# Patient Record
Sex: FEMALE | Race: WHITE
[De-identification: names, ages, dates, MRNs, and addresses within clinical notes are randomized per-mention and may not be internally consistent; named-entity substitution may affect disease eponyms.]

---

## 2017-07-05 ENCOUNTER — HOSPITAL ENCOUNTER (EMERGENCY)
Dept: HOSPITAL 8 - ED | Age: 37
Discharge: HOME | End: 2017-07-05
Payer: SELF-PAY

## 2017-07-05 VITALS — SYSTOLIC BLOOD PRESSURE: 112 MMHG | DIASTOLIC BLOOD PRESSURE: 87 MMHG

## 2017-07-05 VITALS — BODY MASS INDEX: 34.11 KG/M2 | HEIGHT: 68 IN | WEIGHT: 225.09 LBS

## 2017-07-05 DIAGNOSIS — H81.10: Primary | ICD-10-CM

## 2017-07-05 DIAGNOSIS — F17.200: ICD-10-CM

## 2017-07-05 PROCEDURE — 99283 EMERGENCY DEPT VISIT LOW MDM: CPT

## 2017-12-20 ENCOUNTER — HOSPITAL ENCOUNTER (EMERGENCY)
Dept: HOSPITAL 8 - ED | Age: 37
LOS: 1 days | End: 2017-12-21
Payer: SELF-PAY

## 2017-12-20 VITALS — DIASTOLIC BLOOD PRESSURE: 82 MMHG | SYSTOLIC BLOOD PRESSURE: 120 MMHG

## 2017-12-20 DIAGNOSIS — J20.9: Primary | ICD-10-CM

## 2017-12-20 DIAGNOSIS — J02.8: ICD-10-CM

## 2017-12-20 DIAGNOSIS — F17.210: ICD-10-CM

## 2017-12-20 PROCEDURE — 71020: CPT

## 2017-12-20 PROCEDURE — 99284 EMERGENCY DEPT VISIT MOD MDM: CPT

## 2018-01-24 ENCOUNTER — NON-PROVIDER VISIT (OUTPATIENT)
Dept: URGENT CARE | Facility: PHYSICIAN GROUP | Age: 38
End: 2018-01-24

## 2018-01-24 DIAGNOSIS — Z02.1 PRE-EMPLOYMENT DRUG SCREENING: ICD-10-CM

## 2018-01-24 LAB
AMP AMPHETAMINE: NEGATIVE
BAR BARBITURATES: NEGATIVE
BZO BENZODIAZEPINES: NEGATIVE
COC COCAINE: NEGATIVE
INT CON NEG: NEGATIVE
INT CON POS: POSITIVE
MDMA ECSTASY: NEGATIVE
MET METHAMPHETAMINES: NEGATIVE
MTD METHADONE: NEGATIVE
OPI OPIATES: NEGATIVE
OXY OXYCODONE: NEGATIVE
PCP PHENCYCLIDINE: NEGATIVE
POC URINE DRUG SCREEN OCDRS: NORMAL
THC: NEGATIVE

## 2018-01-24 PROCEDURE — 80305 DRUG TEST PRSMV DIR OPT OBS: CPT | Performed by: PHYSICIAN ASSISTANT

## 2018-10-28 ENCOUNTER — HOSPITAL ENCOUNTER (EMERGENCY)
Dept: HOSPITAL 8 - ED | Age: 38
Discharge: HOME | End: 2018-10-28
Payer: COMMERCIAL

## 2018-10-28 VITALS — SYSTOLIC BLOOD PRESSURE: 121 MMHG | DIASTOLIC BLOOD PRESSURE: 95 MMHG

## 2018-10-28 VITALS — WEIGHT: 215.61 LBS | BODY MASS INDEX: 32.68 KG/M2 | HEIGHT: 68 IN

## 2018-10-28 DIAGNOSIS — R55: ICD-10-CM

## 2018-10-28 DIAGNOSIS — R42: Primary | ICD-10-CM

## 2018-10-28 LAB
ALBUMIN SERPL-MCNC: 3.3 G/DL (ref 3.4–5)
ALP SERPL-CCNC: 96 U/L (ref 45–117)
ALT SERPL-CCNC: 26 U/L (ref 12–78)
ANION GAP SERPL CALC-SCNC: 8 MMOL/L (ref 5–15)
BASOPHILS # BLD AUTO: 0.03 X10^3/UL (ref 0–0.1)
BASOPHILS NFR BLD AUTO: 1 % (ref 0–1)
BILIRUB SERPL-MCNC: 0.4 MG/DL (ref 0.2–1)
CALCIUM SERPL-MCNC: 8.4 MG/DL (ref 8.5–10.1)
CHLORIDE SERPL-SCNC: 112 MMOL/L (ref 98–107)
CREAT SERPL-MCNC: 0.81 MG/DL (ref 0.55–1.02)
CULTURE INDICATED?: YES
EOSINOPHIL # BLD AUTO: 0.23 X10^3/UL (ref 0–0.4)
EOSINOPHIL NFR BLD AUTO: 4 % (ref 1–7)
ERYTHROCYTE [DISTWIDTH] IN BLOOD BY AUTOMATED COUNT: 13.7 % (ref 9.6–15.2)
HCG UR SG: 1.02 (ref 1–1.03)
LYMPHOCYTES # BLD AUTO: 1.35 X10^3/UL (ref 1–3.4)
LYMPHOCYTES NFR BLD AUTO: 22 % (ref 22–44)
MCH RBC QN AUTO: 32.3 PG (ref 27–34.8)
MCHC RBC AUTO-ENTMCNC: 33.6 G/DL (ref 32.4–35.8)
MCV RBC AUTO: 95.9 FL (ref 80–100)
MD: NO
MICROSCOPIC: (no result)
MONOCYTES # BLD AUTO: 0.42 X10^3/UL (ref 0.2–0.8)
MONOCYTES NFR BLD AUTO: 7 % (ref 2–9)
NEUTROPHILS # BLD AUTO: 4.04 X10^3/UL (ref 1.8–6.8)
NEUTROPHILS NFR BLD AUTO: 67 % (ref 42–75)
PLATELET # BLD AUTO: 242 X10^3/UL (ref 130–400)
PMV BLD AUTO: 7.8 FL (ref 7.4–10.4)
PROT SERPL-MCNC: 6.7 G/DL (ref 6.4–8.2)
RBC # BLD AUTO: 4.19 X10^6/UL (ref 3.82–5.3)

## 2018-10-28 PROCEDURE — 96374 THER/PROPH/DIAG INJ IV PUSH: CPT

## 2018-10-28 PROCEDURE — 36415 COLL VENOUS BLD VENIPUNCTURE: CPT

## 2018-10-28 PROCEDURE — 81025 URINE PREGNANCY TEST: CPT

## 2018-10-28 PROCEDURE — 87147 CULTURE TYPE IMMUNOLOGIC: CPT

## 2018-10-28 PROCEDURE — 80053 COMPREHEN METABOLIC PANEL: CPT

## 2018-10-28 PROCEDURE — 85025 COMPLETE CBC W/AUTO DIFF WBC: CPT

## 2018-10-28 PROCEDURE — 96361 HYDRATE IV INFUSION ADD-ON: CPT

## 2018-10-28 PROCEDURE — 81001 URINALYSIS AUTO W/SCOPE: CPT

## 2018-10-28 PROCEDURE — 93005 ELECTROCARDIOGRAM TRACING: CPT

## 2018-10-28 PROCEDURE — 99285 EMERGENCY DEPT VISIT HI MDM: CPT

## 2018-10-28 PROCEDURE — 87086 URINE CULTURE/COLONY COUNT: CPT

## 2019-02-05 ENCOUNTER — HOSPITAL ENCOUNTER (EMERGENCY)
Dept: HOSPITAL 8 - ED | Age: 39
Discharge: HOME | End: 2019-02-05
Payer: SELF-PAY

## 2019-02-05 VITALS — DIASTOLIC BLOOD PRESSURE: 78 MMHG | SYSTOLIC BLOOD PRESSURE: 117 MMHG

## 2019-02-05 VITALS — HEIGHT: 60 IN | BODY MASS INDEX: 42.42 KG/M2 | WEIGHT: 216.05 LBS

## 2019-02-05 DIAGNOSIS — F17.210: ICD-10-CM

## 2019-02-05 DIAGNOSIS — L02.415: Primary | ICD-10-CM

## 2019-02-05 PROCEDURE — 90715 TDAP VACCINE 7 YRS/> IM: CPT

## 2019-02-05 PROCEDURE — 90471 IMMUNIZATION ADMIN: CPT

## 2020-02-08 ENCOUNTER — HOSPITAL ENCOUNTER (EMERGENCY)
Dept: HOSPITAL 8 - ED | Age: 40
LOS: 1 days | Discharge: HOME | End: 2020-02-09
Payer: SELF-PAY

## 2020-02-08 VITALS — WEIGHT: 209.44 LBS | BODY MASS INDEX: 31.74 KG/M2 | HEIGHT: 68 IN

## 2020-02-08 VITALS — SYSTOLIC BLOOD PRESSURE: 110 MMHG | DIASTOLIC BLOOD PRESSURE: 68 MMHG

## 2020-02-08 DIAGNOSIS — A09: Primary | ICD-10-CM

## 2020-02-08 DIAGNOSIS — F17.200: ICD-10-CM

## 2020-02-08 DIAGNOSIS — R11.2: ICD-10-CM

## 2020-02-08 LAB
BASOPHILS # BLD AUTO: 0.03 X10^3/UL (ref 0–0.1)
BASOPHILS NFR BLD AUTO: 0 % (ref 0–1)
EOSINOPHIL # BLD AUTO: 0.17 X10^3/UL (ref 0–0.4)
EOSINOPHIL NFR BLD AUTO: 2 % (ref 1–7)
ERYTHROCYTE [DISTWIDTH] IN BLOOD BY AUTOMATED COUNT: 13.1 % (ref 9.6–15.2)
LYMPHOCYTES # BLD AUTO: 0.79 X10^3/UL (ref 1–3.4)
LYMPHOCYTES NFR BLD AUTO: 8 % (ref 22–44)
MCH RBC QN AUTO: 32.4 PG (ref 27–34.8)
MCHC RBC AUTO-ENTMCNC: 33 G/DL (ref 32.4–35.8)
MCV RBC AUTO: 98.3 FL (ref 80–100)
MD: NO
MONOCYTES # BLD AUTO: 0.26 X10^3/UL (ref 0.2–0.8)
MONOCYTES NFR BLD AUTO: 3 % (ref 2–9)
NEUTROPHILS # BLD AUTO: 8.63 X10^3/UL (ref 1.8–6.8)
NEUTROPHILS NFR BLD AUTO: 87 % (ref 42–75)
PLATELET # BLD AUTO: 279 X10^3/UL (ref 130–400)
PMV BLD AUTO: 7.4 FL (ref 7.4–10.4)
RBC # BLD AUTO: 4.81 X10^6/UL (ref 3.82–5.3)

## 2020-02-08 PROCEDURE — 36415 COLL VENOUS BLD VENIPUNCTURE: CPT

## 2020-02-08 PROCEDURE — 80053 COMPREHEN METABOLIC PANEL: CPT

## 2020-02-08 PROCEDURE — 99283 EMERGENCY DEPT VISIT LOW MDM: CPT

## 2020-02-08 PROCEDURE — 85025 COMPLETE CBC W/AUTO DIFF WBC: CPT

## 2020-02-09 LAB
ALBUMIN SERPL-MCNC: 3.5 G/DL (ref 3.4–5)
ALP SERPL-CCNC: 117 U/L (ref 45–117)
ALT SERPL-CCNC: 51 U/L (ref 12–78)
ANION GAP SERPL CALC-SCNC: 4 MMOL/L (ref 5–15)
BILIRUB SERPL-MCNC: 0.9 MG/DL (ref 0.2–1)
CALCIUM SERPL-MCNC: 8.3 MG/DL (ref 8.5–10.1)
CHLORIDE SERPL-SCNC: 107 MMOL/L (ref 98–107)
CREAT SERPL-MCNC: 0.94 MG/DL (ref 0.55–1.02)
PROT SERPL-MCNC: 7.2 G/DL (ref 6.4–8.2)

## 2020-02-09 NOTE — NUR
Pt reports n/v/d that began yesterday at 0500, states her kids got her sick. 
Placed vitals signs monitoring placed. ERP at bedside.

## 2022-11-13 ENCOUNTER — HOSPITAL ENCOUNTER (EMERGENCY)
Facility: MEDICAL CENTER | Age: 42
End: 2022-11-13
Attending: EMERGENCY MEDICINE
Payer: COMMERCIAL

## 2022-11-13 VITALS
OXYGEN SATURATION: 100 % | BODY MASS INDEX: 37.26 KG/M2 | RESPIRATION RATE: 18 BRPM | TEMPERATURE: 98 F | SYSTOLIC BLOOD PRESSURE: 122 MMHG | HEART RATE: 92 BPM | HEIGHT: 69 IN | WEIGHT: 251.54 LBS | DIASTOLIC BLOOD PRESSURE: 78 MMHG

## 2022-11-13 DIAGNOSIS — Z01.419 NORMAL VAGINAL EXAM: ICD-10-CM

## 2022-11-13 PROCEDURE — 99284 EMERGENCY DEPT VISIT MOD MDM: CPT

## 2022-11-13 ASSESSMENT — LIFESTYLE VARIABLES
DO YOU DRINK ALCOHOL: NO
HAVE PEOPLE ANNOYED YOU BY CRITICIZING YOUR DRINKING: NO
TOTAL SCORE: 0
HAVE YOU EVER FELT YOU SHOULD CUT DOWN ON YOUR DRINKING: NO
EVER HAD A DRINK FIRST THING IN THE MORNING TO STEADY YOUR NERVES TO GET RID OF A HANGOVER: NO
TOTAL SCORE: 0
CONSUMPTION TOTAL: INCOMPLETE
TOTAL SCORE: 0
EVER FELT BAD OR GUILTY ABOUT YOUR DRINKING: NO

## 2022-11-13 NOTE — ED NOTES
Pt A&O x 4 and ambulatory with a steady gait. Pt brought back from triage to [facility] [address]. Pt stated she has a tampon in for multiple days, with no other noted complaint. Pt placed into a position of comfort and given a call light with instructions on how to use it.

## 2022-11-13 NOTE — ED PROVIDER NOTES
"ED Provider Note    Scribed for Liz Goel M.D. by Bruce Barber. 11/13/2022  8:28 AM    Primary care provider: Pcp Pt States None  Means of arrival: Walk-In  History obtained from: Patient  History limited by: None    CHIEF COMPLAINT  Chief Complaint   Patient presents with    Foreign Body in Vagina     Pt reports that she has a tampon in that has been in for possibly 3 days.      HPI  Valery Wiseman is a 42 y.o. female who presents for retained tampon. She reports that she has had a tampon inside her vagina for 3 days, stating that she went to the bathroom and forgot to take it out and realized that it was still inside yesterday. She reports attempting to remove it and states that the pelvic pain is similar to period cramps which she normally gets during her menstrual cycle.  Valery has taken Ibuprofen yesterday with mild alleviation, normally greater alleviation is achieved. She endorses associated chills last night but denies any fever or increased urinary frequency. She is currently on her period but has not seen menstrual blood in 3 days. She denies inserting anything else into her vagina. Valery denies any history of diabetes or other pertinent medical history.  Patient reports that she does not think she is pregnant as she has not had sex in \"years.\"    REVIEW OF SYSTEMS  Pertinent positives include abdominal pain, pain when urinating, and chills.   Pertinent negatives include no fever, increased urinary frequency.   See HPI for further details. All other systems are negative.    PAST MEDICAL HISTORY  History reviewed. No pertinent past medical history.    FAMILY HISTORY  History reviewed. No pertinent family history.    SOCIAL HISTORY  Social History     Tobacco Use    Smoking status: Every Day     Packs/day: 0.33     Types: Cigarettes    Smokeless tobacco: Never   Vaping Use    Vaping Use: Never used   Substance Use Topics    Alcohol use: Not Currently    Drug use: Not Currently      Social History " "    Substance and Sexual Activity   Drug Use Not Currently       SURGICAL HISTORY  Past Surgical History:   Procedure Laterality Date    GYN SURGERY             CURRENT MEDICATIONS  Home Medications       Reviewed by Sammi Quick R.N. (Registered Nurse) on 22 at 0728  Med List Status: Not Addressed     Medication Last Dose Status        Patient Rahul Taking any Medications                           ALLERGIES  No Known Allergies    PHYSICAL EXAM  VITAL SIGNS: /83   Pulse 99   Temp 36 °C (96.8 °F) (Temporal)   Resp 18   Ht 1.753 m (5' 9\")   Wt 114 kg (251 lb 8.7 oz)   LMP 11/10/2022   SpO2 100%   BMI 37.15 kg/m²    Constitutional: Well developed, well nourished; No acute distress; Non-toxic appearance. Elevated BMI.   HENT: Normocephalic, atraumatic; Bilateral external ears normal; Oropharynx with moist mucous membranes; No erythema or exudates in the posterior oropharynx.   Eyes: PERRL, EOMI, Conjunctiva normal. No discharge.   Neck:  Supple, nontender midline; No stridor; No nuchal rigidity.   Lymphatic: No cervical lymphadenopathy noted.   Cardiovascular: Regular rate and rhythm without murmurs, rubs, or gallop.   Thorax & Lungs: No respiratory distress, breath sounds clear to auscultation bilaterally without wheezing, rales or rhonchi. Nontender chest wall. No crepitus or subcutaneous air  Abdomen: Soft, mild inconsistent tenderness in the suprapubic area, bowel sounds normal. No obvious masses; No pulsatile masses; no rebound, guarding, or peritoneal signs.   Skin: Good color; warm and dry without rash or petechia.  Back: Nontender, No CVA tenderness.   Extremities: Distal radial, dorsalis pedis, posterior tibial pulses are equal bilaterally; Trace edema to ankles; Nontender calves or saphenous, No cyanosis, No clubbing.  : External genitalia is normal without lesions or vesicles.  Easily able to visualize cervix, anterior and posterior fornices. small amount of dark menstrual " "blood in vault. Digital exam preformed and able to feel all around cervix and into the fornices with no palpable tampon.  At this time there does not appear to be retained tampon upon visual inspection or palpation/tactile inspection.  Musculoskeletal: Good range of motion in all major joints. No tenderness to palpation or major deformities noted.   Neurologic: Alert & oriented x 4, clear speech.    COURSE & MEDICAL DECISION MAKING  Pertinent Labs & Imaging studies reviewed. (See chart for details)    8:28 AM - Patient seen and examined at bedside. Discussed plan of care, including pelvic exam to attempt removal of tampon (see PE for further detail). Patient agrees to the plan of care. Ordered for pregnancy test to evaluate her symptoms.     9:24 AM Patient refuses pregnancy test reporting \"I haven't had sex in years.\" Discharge was discussed at this time. Patient verbalizes understanding and agreement to this plan of care.      Patient presents to the ER complaining of a retained tampon.  She believes she put a tampon in 3 days ago.  She says she recalls going to the bathroom and then remembered yesterday that she had forgotten to take it out.  She said that she and another friend tried to find it but  were unable.  Patient said she has some chills last night.  She describes some lower abdominal cramping which feels very similar to menstrual cramping.  She is on her period.  She had a small amount of dark blood in the vaginal vault.  There is no visualized or palpated retained tampon.  I ordered pregnancy test but patient refused.  At this time patient is safe and stable for outpatient management discharge home.  No retained tampon identified.  Perhaps she took it out without realizing it, or perhaps she strained to have a bowel movement and it came out.  Nonetheless, the vaginal vault is clear at this time.  She has been given strict return precautions and discharge instructions and she understands treatment plan " and follow-up.    The patient will return for new or worsening symptoms and is stable at the time of discharge.    The patient is referred to a primary physician for blood pressure management, diabetic screening, and for all other preventative health concerns.    DISPOSITION:  Patient will be discharged home in stable condition.    FOLLOW UP:  [facility] ([facility]) - Primary Care and Family Medicine  [address] [place] [id number]  [phone]  Schedule an appointment as soon as possible for a visit in 2 days  If symptoms worsen return to ER    FINAL IMPRESSION  1. Normal vaginal exam Acute      Bruce CARLSON (Scribe), am scribing for, and in the presence of, Liz Goel M.D..    Electronically signed by: Bruce Barber (Lorena), 11/13/2022    Liz CARLSON M.D. personally performed the services described in this documentation, as scribed by Bruce Barber in my presence, and it is both accurate and complete.    This dictation has been created using voice recognition software. The accuracy of the dictation is limited by the abilities of the software. I expect there may be some errors of grammar and possibly content. I made every attempt to manually correct the errors within my dictation. However, errors related to voice recognition software may still exist and should be interpreted within the appropriate context.    The note accurately reflects work and decisions made by me.  Liz Goel M.D.  11/13/2022  3:04 PM

## 2022-11-13 NOTE — DISCHARGE INSTRUCTIONS
Return to the ER for any worsening vaginal bleeding, pelvic pain, dizziness or lightheadedness, fevers over 100.4, shaking chills, abnormal vaginal discharge, nausea, vomiting, or for any concerns.    Follow-up with the [facility] clinic within the next 1 to 2 days.  Please call for appointment.

## 2022-11-13 NOTE — ED TRIAGE NOTES
Pt ambulated to triage with   Chief Complaint   Patient presents with    Foreign Body in Vagina     Pt reports that she has a tampon in that has been in for possibly 3 days.       Pt Informed regarding triage process and verbalized understanding to inform triage tech or RN for any changes in condition. Placed in lobby.

## 2022-11-13 NOTE — ED NOTES
Pt ambulatory.  Vital signs stable.   Pt states understanding of discharge instructions and paperwork.   Pt states they will follow up with PCP.   Pt states they have a safe way home.

## 2023-02-19 ENCOUNTER — HOSPITAL ENCOUNTER (EMERGENCY)
Facility: MEDICAL CENTER | Age: 43
End: 2023-02-19
Attending: EMERGENCY MEDICINE
Payer: COMMERCIAL

## 2023-02-19 VITALS
RESPIRATION RATE: 18 BRPM | WEIGHT: 239.86 LBS | SYSTOLIC BLOOD PRESSURE: 144 MMHG | OXYGEN SATURATION: 98 % | HEART RATE: 88 BPM | BODY MASS INDEX: 35.42 KG/M2 | DIASTOLIC BLOOD PRESSURE: 98 MMHG | TEMPERATURE: 99.4 F

## 2023-02-19 DIAGNOSIS — K04.7 DENTAL ABSCESS: ICD-10-CM

## 2023-02-19 PROCEDURE — 700102 HCHG RX REV CODE 250 W/ 637 OVERRIDE(OP): Performed by: EMERGENCY MEDICINE

## 2023-02-19 PROCEDURE — 700111 HCHG RX REV CODE 636 W/ 250 OVERRIDE (IP): Performed by: EMERGENCY MEDICINE

## 2023-02-19 PROCEDURE — 99283 EMERGENCY DEPT VISIT LOW MDM: CPT

## 2023-02-19 PROCEDURE — 700101 HCHG RX REV CODE 250: Performed by: EMERGENCY MEDICINE

## 2023-02-19 PROCEDURE — 303977 HCHG I & D

## 2023-02-19 PROCEDURE — A9270 NON-COVERED ITEM OR SERVICE: HCPCS | Performed by: EMERGENCY MEDICINE

## 2023-02-19 RX ORDER — HYDROCODONE BITARTRATE AND ACETAMINOPHEN 5; 325 MG/1; MG/1
1 TABLET ORAL ONCE
Status: COMPLETED | OUTPATIENT
Start: 2023-02-19 | End: 2023-02-19

## 2023-02-19 RX ORDER — ONDANSETRON 4 MG/1
4 TABLET, ORALLY DISINTEGRATING ORAL ONCE
Status: COMPLETED | OUTPATIENT
Start: 2023-02-19 | End: 2023-02-19

## 2023-02-19 RX ORDER — NAPROXEN 375 MG/1
375 TABLET ORAL 2 TIMES DAILY WITH MEALS
Qty: 20 TABLET | Refills: 0 | Status: SHIPPED | OUTPATIENT
Start: 2023-02-19

## 2023-02-19 RX ORDER — NAPROXEN 375 MG/1
375 TABLET ORAL 2 TIMES DAILY WITH MEALS
Qty: 20 TABLET | Refills: 0 | Status: SHIPPED | OUTPATIENT
Start: 2023-02-19 | End: 2023-02-19 | Stop reason: SDUPTHER

## 2023-02-19 RX ORDER — AMOXICILLIN AND CLAVULANATE POTASSIUM 875; 125 MG/1; MG/1
1 TABLET, FILM COATED ORAL 2 TIMES DAILY
Qty: 20 TABLET | Refills: 0 | Status: ACTIVE | OUTPATIENT
Start: 2023-02-19 | End: 2023-02-19 | Stop reason: SDUPTHER

## 2023-02-19 RX ORDER — AMOXICILLIN AND CLAVULANATE POTASSIUM 875; 125 MG/1; MG/1
1 TABLET, FILM COATED ORAL 2 TIMES DAILY
Qty: 20 TABLET | Refills: 0 | Status: SHIPPED | OUTPATIENT
Start: 2023-02-19 | End: 2023-02-19 | Stop reason: SDUPTHER

## 2023-02-19 RX ORDER — AMOXICILLIN AND CLAVULANATE POTASSIUM 875; 125 MG/1; MG/1
1 TABLET, FILM COATED ORAL 2 TIMES DAILY
Qty: 20 TABLET | Refills: 0 | Status: ACTIVE | OUTPATIENT
Start: 2023-02-19

## 2023-02-19 RX ORDER — AMOXICILLIN AND CLAVULANATE POTASSIUM 875; 125 MG/1; MG/1
1 TABLET, FILM COATED ORAL ONCE
Status: COMPLETED | OUTPATIENT
Start: 2023-02-19 | End: 2023-02-19

## 2023-02-19 RX ADMIN — ONDANSETRON 4 MG: 4 TABLET, ORALLY DISINTEGRATING ORAL at 17:14

## 2023-02-19 RX ADMIN — AMOXICILLIN AND CLAVULANATE POTASSIUM 1 TABLET: 875; 125 TABLET, FILM COATED ORAL at 17:14

## 2023-02-19 RX ADMIN — HYDROCODONE BITARTRATE AND ACETAMINOPHEN 1 TABLET: 5; 325 TABLET ORAL at 17:15

## 2023-02-19 RX ADMIN — LIDOCAINE HYDROCHLORIDE 5 ML: 10; .005 INJECTION, SOLUTION EPIDURAL; INFILTRATION; INTRACAUDAL; PERINEURAL at 17:15

## 2023-02-19 ASSESSMENT — PAIN DESCRIPTION - PAIN TYPE: TYPE: ACUTE PAIN

## 2023-02-19 NOTE — ED TRIAGE NOTES
Pt comes in complaining of R sided dental pain and swelling. Pt stating she does have a dental appt tomorrow. Pt tearful. Pt also reporting R sided ear pain

## 2023-02-20 NOTE — ED NOTES
Assist RN: Pt called regarding prescriptions. This ED RN did speak to Dr Michel and he will send updated prescriptions.

## 2023-02-20 NOTE — ED NOTES
Patient delay discharge due to waiting for ride.    Discharge instructions reviewed with pt. Pt verbalized understanding. Pt ambulated out of er with steady gait and all belongings. With rx and follow up instructions.

## 2023-02-20 NOTE — DISCHARGE PLANNING
John, from [facility], called and stated he spoke with the pt and she wants the [facility] at [address] [facility] to be her pharmacy on record. I told him I could make this change in Epic, and he stated he will let the pt know that her chart has been updated. I then made this change to her chart.

## 2023-02-20 NOTE — DISCHARGE PLANNING
Pt called and stated she has been waiting for 2 hours for her RX's at the [facility] on [facility] and they do not have them. ALBINO voalted Dr Michel who graciously resent RX's. ALBINO called [facility] and verified they received them. ALBINO then called pt and told her they were ready for .

## 2023-02-20 NOTE — ED PROVIDER NOTES
ER Provider Note    Scribed for Asad Michel D.O. by Tobi Horowitz. 2023  4:35 PM    Primary Care Provider: Pcp Pt States None    CHIEF COMPLAINT  Chief Complaint   Patient presents with    Dental Pain           Facial Swelling       e    Ear Pain       HPI/ROS    Valery Wiseman is a 42 y.o. female who presents to the Emergency Department for evaluation of acute right-sided dental pain onset four days ago.  Patient states that she has a dentist appointment tomorrow, however, is unable to stand the pain, prompting her to present to the ED today for further evaluation. Patient has associated swelling to her right jaw, ear pain, fevers, and chills. Denies any dysphagia. No alleviating factors reported. No known drug allergies.     ROS as per HPI.    PAST MEDICAL HISTORY  History reviewed. No pertinent past medical history.    SURGICAL HISTORY  Past Surgical History:   Procedure Laterality Date    GYN SURGERY             FAMILY HISTORY  No family history noted.     SOCIAL HISTORY   reports that she has been smoking cigarettes. She has been smoking an average of .33 packs per day. She has never used smokeless tobacco. She reports that she does not currently use alcohol. She reports that she does not currently use drugs.    CURRENT MEDICATIONS  None noted    ALLERGIES  Patient has no known allergies.    PHYSICAL EXAM  /82   Pulse (!) 121   Temp (!) 38.2 °C (100.8 °F) (Oral)   Resp 20   Wt 109 kg (239 lb 13.8 oz)   SpO2 98%   BMI 35.42 kg/m²     General: No acute distress.  HENT: Normocephalic, Mucus membranes are moist. Right maxillary facial swelling. Roof of mouth at the right posterior has a palpable abscess.   Chest: Lungs have even and unlabored respirations, Clear to auscultation.   Cardiovascular: Regular rate and regular rhythm, No peripheral cyanosis.  Abdomen: Non distended.  Neuro: Awake, Conversive, Able to relay recent events.  Psychiatric: Calm and cooperative.     EXTERNAL  RECORDS REVIEWED  No recent ER visits for dental pain    INITIAL ASSESSMENT  Patient has facial swelling with poor dentition and there is a palpable abscess. No airway compromise. No difficulty swallowing. Patient has an abscess drained here. Plans for antibiotics.     ED Observation Status? Yes; I am placing the patient in to an observation status due to a diagnostic uncertainty as well as therapeutic intensity. Patient placed in observation status at 4:53 PM, 2/19/2023.     Observation plan is as follows: I&D of abscess and initiate antibiotics and pain medication.     Upon Reevaluation, the patient's condition has: Improved; and will be discharged.    Patient discharged from ED Observation status at 5:06 PM (Time) 2/19/23 (Date).     PROCEDURES    Incision and Drainage Procedure Note    Indication: Abscess    Procedure: The patient was positioned appropriately and the skin over the incision site was prepped with betadine and draped in a sterile fashion. Local anesthesia was obtained by infiltration using 1% Lidocaine with epinephrine.  An incision was then made over the apex of the lesion and approximately 2 cc of thick material was expressed. Loculations were not present. The patient’s tetanus status was up to date and did not require a booster dose.    The patient tolerated the procedure well.    Complications: None     COURSE & MEDICAL DECISION MAKING     COURSE AND PLAN  4:35 PM - Patient seen and examined at bedside. Discussed plan of care, including treating her with numbing medication and draining her abscess. Patient agrees to the plan of care. The patient will be  medicated with lidocaine-epinephrine 1%.     4:52 PM - Patient was reevaluated at bedside. I preformed an I&D on the patient. See above for details. Patient will be treated with Augmentin 875-125 mg, Norco 5-325 mg, and Zofran 4 mg for her symptoms. I then informed the patient of my plan for discharge, which includes strict return precautions  for any new or worsening symptoms. Patient understands and verbalizes agreement to plan of care. Patient is comfortable going home at this time.      ED Summary: Patient presented with dental abscess.  In the process of injecting it it did cause I&D, the purulent drainage was expressed from the area.  The patient is started on antibiotics.  She did have some nausea after the I&D due to the purulent drainage.  She was treated with antiemetics with good results.  She is stable for discharge home.    Decision tools and prescription drugs considered including, but not limited to: antibiotics and pain medication    HTN/IDDM FOLLOW UP:  The patient is referred to a primary physician for blood pressure management, diabetic screening, and for all other preventive health concerns    DISPOSITION AND DISCUSSIONS  Patient will be discharged home.    FOLLOW UP:    see dentist tomorrow as scheduled  In 1 day        OUTPATIENT MEDICATIONS:  Discharge Medication List as of 2/19/2023  5:30 PM        START taking these medications    Details   naproxen (NAPROSYN) 375 MG Tab Take 1 Tablet by mouth 2 times a day with meals., Disp-20 Tablet, R-0, Normal      amoxicillin-clavulanate (AUGMENTIN) 875-125 MG Tab Take 1 Tablet by mouth 2 times a day., Disp-20 Tablet, R-0, Normal              FINAL DIAGNOSIS  1. Dental abscess    2.      Incision and Drainage Procedure     Tobi CARLSON (Lorena), am scribing for, and in the presence of, Asad Michel D.O..    Electronically signed by: Tobi Horowitz (Lorena), 2/19/2023    Asad CARLSON D.O. personally performed the services described in this documentation, as scribed by Tobi Horowitz in my presence, and it is both accurate and complete.    The note accurately reflects work and decisions made by me.  Asad Michel D.O.  2/19/2023  9:22 PM